# Patient Record
Sex: MALE | Race: ASIAN | Employment: FULL TIME | URBAN - METROPOLITAN AREA
[De-identification: names, ages, dates, MRNs, and addresses within clinical notes are randomized per-mention and may not be internally consistent; named-entity substitution may affect disease eponyms.]

---

## 2020-06-17 ENCOUNTER — OFFICE VISIT (OUTPATIENT)
Dept: URGENT CARE | Facility: URGENT CARE | Age: 45
End: 2020-06-17
Payer: COMMERCIAL

## 2020-06-17 VITALS — TEMPERATURE: 98.7 F

## 2020-06-17 DIAGNOSIS — H92.21 BLEEDING FROM RIGHT EAR: Primary | ICD-10-CM

## 2020-06-17 PROCEDURE — 99203 OFFICE O/P NEW LOW 30 MIN: CPT | Performed by: FAMILY MEDICINE

## 2020-06-17 ASSESSMENT — MIFFLIN-ST. JEOR: SCORE: 1478.65

## 2020-06-17 NOTE — PROGRESS NOTES
"Chief Complaint   Patient presents with     Ear Problem     itchy right ear      SUBJECTIVE:  Ulices Elam is a 45 year old male who presents with right ear pain and itching  for 1 day(s).   He did some scratching as it was itching following which there was some bleeding   And he had some pain in the ear but now pain resolved   Severity: mild   Timing:sudden onset  Additional symptoms include none.      History of recurrent otitis: no  History reviewed. No pertinent family history.    History reviewed. No pertinent past medical history.  No current outpatient medications on file.     Social History     Tobacco Use     Smoking status: Current Every Day Smoker     Types: Cigarettes   Substance Use Topics     Alcohol use: Not on file       ROS:   10 point ROS of systems including Constitutional, Eyes, Respiratory, Cardiovascular, Gastroenterology, Genitourinary, Integumentary, Muscularskeletal, Psychiatric were all negative except for pertinent positives noted in my HPI           OBJECTIVE:  BP (P) 122/75 (BP Location: Left arm, Patient Position: Sitting, Cuff Size: Adult Regular)   Pulse (P) 74   Temp 98.7  F (37.1  C) (Tympanic)   Ht (P) 1.676 m (5' 6\")   Wt (P) 65.6 kg (144 lb 9.6 oz)   SpO2 (P) 100%   BMI (P) 23.34 kg/m     EXAM:  The right TM is normal: no effusions, no erythema, and normal landmarks     The right auditory canal is dried blood noted possibly from the abrasion of the ear canalThe left TM is normal: no effusions, no erythema, and normal landmarks  The left auditory canal is normal and without drainage, edema or erythema  Oropharynx exam is normal: no lesions, erythema, adenopathy or exudate.  GENERAL: no acute distress  EYES: EOMI,  PERRL, conjunctiva clear  NECK: supple, non-tender to palpation, no adenopathy noted  RESP: lungs clear to auscultation - no rales, rhonchi or wheezes  CV: regular rates and rhythm, normal S1 S2, no murmur noted  SKIN: no suspicious lesions or rashes "     ASSESSMENT:  Ulices was seen today for ear problem.    Diagnoses and all orders for this visit:    Bleeding from right ear          PLAN:  See orders in Epic  Assured pt that TM is normal   Avoid using q tips   If there is any worsening pain should follow up   Follow up if  symptoms fail to improve or worsens   Pt understood and agreed with plan     Demi Baxter MD

## 2020-12-04 ENCOUNTER — OFFICE VISIT (OUTPATIENT)
Dept: URGENT CARE | Facility: URGENT CARE | Age: 45
End: 2020-12-04
Payer: COMMERCIAL

## 2020-12-04 VITALS
DIASTOLIC BLOOD PRESSURE: 75 MMHG | HEIGHT: 66 IN | SYSTOLIC BLOOD PRESSURE: 135 MMHG | OXYGEN SATURATION: 97 % | TEMPERATURE: 98.9 F | WEIGHT: 140 LBS | RESPIRATION RATE: 18 BRPM | BODY MASS INDEX: 22.5 KG/M2

## 2020-12-04 DIAGNOSIS — R82.90 NONSPECIFIC FINDING ON EXAMINATION OF URINE: ICD-10-CM

## 2020-12-04 DIAGNOSIS — R52 PAIN: Primary | ICD-10-CM

## 2020-12-04 DIAGNOSIS — N30.00 ACUTE CYSTITIS WITHOUT HEMATURIA: ICD-10-CM

## 2020-12-04 LAB
ALBUMIN UR-MCNC: NEGATIVE MG/DL
APPEARANCE UR: CLEAR
BILIRUB UR QL STRIP: NEGATIVE
COLOR UR AUTO: YELLOW
GLUCOSE UR STRIP-MCNC: NEGATIVE MG/DL
HGB UR QL STRIP: ABNORMAL
KETONES UR STRIP-MCNC: NEGATIVE MG/DL
LEUKOCYTE ESTERASE UR QL STRIP: ABNORMAL
NITRATE UR QL: NEGATIVE
PH UR STRIP: 6.5 PH (ref 5–7)
RBC #/AREA URNS AUTO: ABNORMAL /HPF
SOURCE: ABNORMAL
SP GR UR STRIP: 1.02 (ref 1–1.03)
UROBILINOGEN UR STRIP-ACNC: 0.2 EU/DL (ref 0.2–1)
WBC #/AREA URNS AUTO: ABNORMAL /HPF

## 2020-12-04 PROCEDURE — 81001 URINALYSIS AUTO W/SCOPE: CPT | Performed by: FAMILY MEDICINE

## 2020-12-04 PROCEDURE — 87086 URINE CULTURE/COLONY COUNT: CPT | Performed by: STUDENT IN AN ORGANIZED HEALTH CARE EDUCATION/TRAINING PROGRAM

## 2020-12-04 PROCEDURE — 87186 SC STD MICRODIL/AGAR DIL: CPT | Performed by: STUDENT IN AN ORGANIZED HEALTH CARE EDUCATION/TRAINING PROGRAM

## 2020-12-04 PROCEDURE — 99213 OFFICE O/P EST LOW 20 MIN: CPT | Performed by: STUDENT IN AN ORGANIZED HEALTH CARE EDUCATION/TRAINING PROGRAM

## 2020-12-04 PROCEDURE — 87088 URINE BACTERIA CULTURE: CPT | Performed by: STUDENT IN AN ORGANIZED HEALTH CARE EDUCATION/TRAINING PROGRAM

## 2020-12-04 RX ORDER — SULFAMETHOXAZOLE/TRIMETHOPRIM 800-160 MG
1 TABLET ORAL 2 TIMES DAILY
Qty: 6 TABLET | Refills: 0 | Status: SHIPPED | OUTPATIENT
Start: 2020-12-04 | End: 2020-12-07

## 2020-12-04 RX ORDER — IBUPROFEN 400 MG/1
400 TABLET, FILM COATED ORAL EVERY 6 HOURS PRN
Qty: 30 TABLET | Refills: 0 | Status: SHIPPED | OUTPATIENT
Start: 2020-12-04

## 2020-12-04 ASSESSMENT — MIFFLIN-ST. JEOR: SCORE: 1457.79

## 2020-12-04 NOTE — PATIENT INSTRUCTIONS
Please take the bactrim twice a day with food.   Use ibuprofen and tylenol for pain.     Return to care if no improvement in 3 days or worsen symptoms (worse pain, high fever, unable to eat or drink)

## 2020-12-04 NOTE — PROGRESS NOTES
"Subjective     Ulices FITZGERALD Do is a 45 year old male who presents to clinic today for the following health issues:    HPI     Ulices reports 1 day of chills, right sided back pain and painful urination. He denies n/v or diarrhea. He denies a change in PO. He took his temp at home and it was 98. No use of tylenol or ibuprofen. monogamous relationship with a female without concerns for STIs/Ds. No penile discharge.     Review of Systems    ROS: 10 point ROS neg other than the symptoms noted above in the HPI.      Objective    /75 (BP Location: Right arm, Patient Position: Chair, Cuff Size: Adult Regular)   Temp 98.9  F (37.2  C) (Temporal)   Resp 18   Ht 1.676 m (5' 6\")   Wt 63.5 kg (140 lb)   SpO2 97%   BMI 22.60 kg/m    Body mass index is 22.6 kg/m .  Physical Exam   General Appearance: Non toxic, NAD  Eyes: no scleral injections, EOMI, PERRLA  HEENT: NCAT, nares patent, MMM, no oropharynx erythema, no oral lesions or petechiae.  Neck: No JVD, ROM intact, no nuchal rigidity    Respiratory: CTAB, no work of breathing, no crackles, wheezes, ronchi  Cardiovascular: RRR,normal S1, S2, no murmur, extremities wwp, cap refill < 2 secs   GI: soft, no distention, no ttp, no organomegaly, normoactive bs, no peritoneal signs    Lymph/Hematologic: no cervical LAD, no generalized LAD, no bruises   Skin: no rashes, lesions   Musculoskeletal: no peripheral edema, no jnt ttp or effusions. Mild ttp over right CVA   Neurologic: A&OX3, CN 2-10 intact, no focal decifict, strength, sensation intact   Psychiatric: appropriate mood      Results for orders placed or performed in visit on 12/04/20 (from the past 24 hour(s))   UA reflex to Microscopic and Culture    Specimen: Midstream Urine   Result Value Ref Range    Color Urine Yellow     Appearance Urine Clear     Glucose Urine Negative NEG^Negative mg/dL    Bilirubin Urine Negative NEG^Negative    Ketones Urine Negative NEG^Negative mg/dL    Specific Gravity Urine 1.020 1.003 - " 1.035    Blood Urine Moderate (A) NEG^Negative    pH Urine 6.5 5.0 - 7.0 pH    Protein Albumin Urine Negative NEG^Negative mg/dL    Urobilinogen Urine 0.2 0.2 - 1.0 EU/dL    Nitrite Urine Negative NEG^Negative    Leukocyte Esterase Urine Small (A) NEG^Negative    Source Midstream Urine    Urine Microscopic   Result Value Ref Range    WBC Urine  (A) OTO5^0 - 5 /HPF    RBC Urine 2-5 (A) OTO2^O - 2 /HPF           Assessment & Plan     #Complicated acute UTI  Complicated given occurrence in male. No hx of UTis.   Clearly infected on UA, stable physical examination therefore lower concern for acute pyeloneprhtitis.   Bactrim DS X 3 days   UC pending     RTC if worsening symptoms or no improvement after treatment.     Rica Dotson MD  [facility]

## 2020-12-05 LAB
BACTERIA SPEC CULT: ABNORMAL
Lab: ABNORMAL
SPECIMEN SOURCE: ABNORMAL